# Patient Record
Sex: FEMALE | Race: BLACK OR AFRICAN AMERICAN | NOT HISPANIC OR LATINO
[De-identification: names, ages, dates, MRNs, and addresses within clinical notes are randomized per-mention and may not be internally consistent; named-entity substitution may affect disease eponyms.]

---

## 2019-08-13 ENCOUNTER — TRANSCRIPTION ENCOUNTER (OUTPATIENT)
Age: 34
End: 2019-08-13

## 2019-08-13 ENCOUNTER — APPOINTMENT (OUTPATIENT)
Dept: FAMILY MEDICINE | Facility: CLINIC | Age: 34
End: 2019-08-13
Payer: COMMERCIAL

## 2019-08-13 VITALS
DIASTOLIC BLOOD PRESSURE: 100 MMHG | HEART RATE: 87 BPM | WEIGHT: 187.19 LBS | SYSTOLIC BLOOD PRESSURE: 146 MMHG | HEIGHT: 62 IN | TEMPERATURE: 99.6 F | BODY MASS INDEX: 34.45 KG/M2 | OXYGEN SATURATION: 98 %

## 2019-08-13 DIAGNOSIS — Z82.3 FAMILY HISTORY OF STROKE: ICD-10-CM

## 2019-08-13 DIAGNOSIS — Z00.00 ENCOUNTER FOR GENERAL ADULT MEDICAL EXAMINATION W/OUT ABNORMAL FINDINGS: ICD-10-CM

## 2019-08-13 LAB
ALBUMIN SERPL ELPH-MCNC: 4.3 G/DL
ALP BLD-CCNC: 57 U/L
ALT SERPL-CCNC: 18 U/L
ANION GAP SERPL CALC-SCNC: 12 MMOL/L
APPEARANCE: CLEAR
AST SERPL-CCNC: 27 U/L
BASOPHILS # BLD AUTO: 0.02 K/UL
BASOPHILS NFR BLD AUTO: 0.5 %
BILIRUB SERPL-MCNC: 0.3 MG/DL
BILIRUBIN URINE: NEGATIVE
BLOOD URINE: NEGATIVE
BUN SERPL-MCNC: 6 MG/DL
CALCIUM SERPL-MCNC: 8.7 MG/DL
CHLORIDE SERPL-SCNC: 105 MMOL/L
CHOLEST SERPL-MCNC: 160 MG/DL
CHOLEST/HDLC SERPL: 2.9 RATIO
CO2 SERPL-SCNC: 22 MMOL/L
COLOR: NORMAL
CREAT SERPL-MCNC: 0.7 MG/DL
CYTOLOGY CVX/VAG DOC THIN PREP: NORMAL
EOSINOPHIL # BLD AUTO: 0.09 K/UL
EOSINOPHIL NFR BLD AUTO: 2.2 %
ESTIMATED AVERAGE GLUCOSE: 111 MG/DL
GLUCOSE QUALITATIVE U: NEGATIVE
GLUCOSE SERPL-MCNC: 100 MG/DL
HBA1C MFR BLD HPLC: 5.5 %
HCT VFR BLD CALC: 34.6 %
HDLC SERPL-MCNC: 55 MG/DL
HGB BLD-MCNC: 10.5 G/DL
IMM GRANULOCYTES NFR BLD AUTO: 0.2 %
KETONES URINE: NEGATIVE
LDLC SERPL CALC-MCNC: 85 MG/DL
LEUKOCYTE ESTERASE URINE: NEGATIVE
LYMPHOCYTES # BLD AUTO: 1.86 K/UL
LYMPHOCYTES NFR BLD AUTO: 45.3 %
MAN DIFF?: NORMAL
MCHC RBC-ENTMCNC: 24.7 PG
MCHC RBC-ENTMCNC: 30.3 GM/DL
MCV RBC AUTO: 81.4 FL
MONOCYTES # BLD AUTO: 0.38 K/UL
MONOCYTES NFR BLD AUTO: 9.2 %
NEUTROPHILS # BLD AUTO: 1.75 K/UL
NEUTROPHILS NFR BLD AUTO: 42.6 %
NITRITE URINE: NEGATIVE
PH URINE: 7.5
PLATELET # BLD AUTO: 205 K/UL
POTASSIUM SERPL-SCNC: 4.1 MMOL/L
PROT SERPL-MCNC: 6.9 G/DL
PROTEIN URINE: NEGATIVE
RBC # BLD: 4.25 M/UL
RBC # FLD: 15.7 %
SODIUM SERPL-SCNC: 139 MMOL/L
SPECIFIC GRAVITY URINE: 1.02
TRIGL SERPL-MCNC: 102 MG/DL
TSH SERPL-ACNC: 2.22 UIU/ML
UROBILINOGEN URINE: NORMAL
WBC # FLD AUTO: 4.11 K/UL

## 2019-08-13 PROCEDURE — 36415 COLL VENOUS BLD VENIPUNCTURE: CPT

## 2019-08-13 PROCEDURE — 99385 PREV VISIT NEW AGE 18-39: CPT | Mod: 25

## 2019-08-13 NOTE — ASSESSMENT
[FreeTextEntry1] : Patient was counseled on healthy eating habits, on daily exercise and stress relief. All medications and allergies were reviewed with the patient and any adjustments necessary were made. Patient was counseled to try engage in an exercise activity for at least 30 minutes 3-4 times a week.  Patient was advised to eat a diet low in fat and carbohydrates and high in protein, with plenty of vegetables. Patient was advised to try and engage in relaxing activities whenever possible.\par The patients blood was draw in office and will be followed and assessed for any issues.  Patient was told to return to the office if any issues arise.  Unless otherwise stated, the patient is to continue all other medications as previously prescribed.\par \par htn\par history of hypertension\par does not want treatment at this time\par advised of risks including cva, mi death

## 2019-08-13 NOTE — PHYSICAL EXAM
[Well Nourished] : well nourished [Regular Rhythm] : with a regular rhythm [Normal S1, S2] : normal S1 and S2 [No Joint Swelling] : no joint swelling [No Rash] : no rash [Normal Gait] : normal gait

## 2019-08-13 NOTE — HISTORY OF PRESENT ILLNESS
[de-identified] : 33 year old female here to establish care and for a full physical examination. The patients complete medical, family and social history was documented and reviewed with the patient.  The patients medications were identified and also reviewed with the patient as well as any allergies to any medications. All active and previous medical problems were identified and discussed with the patient.  Any new problems were documented. Patient is feeling well today.\par

## 2019-08-13 NOTE — HEALTH RISK ASSESSMENT
[Very Good] : ~his/her~  mood as very good [] : No [Yes] : Yes [No falls in past year] : Patient reported no falls in the past year [0] : 2) Feeling down, depressed, or hopeless: Not at all (0) [[id number]] : 0 [Patient reported PAP Smear was normal] : Patient reported PAP Smear was normal [HIV Test offered] : HIV Test offered [Hepatitis C test offered] : Hepatitis C test offered [With Family] : lives with family [Employed] : employed [Single] : single [Fully functional (bathing, dressing, toileting, transferring, walking, feeding)] : Fully functional (bathing, dressing, toileting, transferring, walking, feeding) [Fully functional (using the telephone, shopping, preparing meals, housekeeping, doing laundry, using] : Fully functional and needs no help or supervision to perform IADLs (using the telephone, shopping, preparing meals, housekeeping, doing laundry, using transportation, managing medications and managing finances) [Smoke Detector] : smoke detector [Seat Belt] :  uses seat belt [PapSmearDate] : 2015 [de-identified] : sister [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

## 2020-06-26 ENCOUNTER — OUTPATIENT (OUTPATIENT)
Dept: OUTPATIENT SERVICES | Facility: HOSPITAL | Age: 35
LOS: 1 days | End: 2020-06-26

## 2020-06-26 VITALS
HEART RATE: 90 BPM | TEMPERATURE: 99 F | DIASTOLIC BLOOD PRESSURE: 80 MMHG | HEIGHT: 62 IN | RESPIRATION RATE: 18 BRPM | WEIGHT: 203.93 LBS | SYSTOLIC BLOOD PRESSURE: 140 MMHG | OXYGEN SATURATION: 98 %

## 2020-06-26 DIAGNOSIS — S43.402A UNSPECIFIED SPRAIN OF LEFT SHOULDER JOINT, INITIAL ENCOUNTER: ICD-10-CM

## 2020-06-26 LAB
HCG UR-SCNC: NEGATIVE — SIGNIFICANT CHANGE UP
HCT VFR BLD CALC: 33.4 % — LOW (ref 34.5–45)
HGB BLD-MCNC: 10.5 G/DL — LOW (ref 11.5–15.5)
MCHC RBC-ENTMCNC: 25.2 PG — LOW (ref 27–34)
MCHC RBC-ENTMCNC: 31.4 % — LOW (ref 32–36)
MCV RBC AUTO: 80.1 FL — SIGNIFICANT CHANGE UP (ref 80–100)
NRBC # FLD: 0 K/UL — SIGNIFICANT CHANGE UP (ref 0–0)
PLATELET # BLD AUTO: 193 K/UL — SIGNIFICANT CHANGE UP (ref 150–400)
PMV BLD: 13.3 FL — HIGH (ref 7–13)
RBC # BLD: 4.17 M/UL — SIGNIFICANT CHANGE UP (ref 3.8–5.2)
RBC # FLD: 18 % — HIGH (ref 10.3–14.5)
SP GR UR: 1.01 — SIGNIFICANT CHANGE UP (ref 1–1.04)
WBC # BLD: 6.45 K/UL — SIGNIFICANT CHANGE UP (ref 3.8–10.5)
WBC # FLD AUTO: 6.45 K/UL — SIGNIFICANT CHANGE UP (ref 3.8–10.5)

## 2020-06-26 RX ORDER — SODIUM CHLORIDE 9 MG/ML
1000 INJECTION, SOLUTION INTRAVENOUS
Refills: 0 | Status: DISCONTINUED | OUTPATIENT
Start: 2020-07-06 | End: 2020-07-21

## 2020-06-26 NOTE — H&P PST ADULT - RS GEN PE MLT RESP DETAILS PC
breath sounds equal/no rales/clear to auscultation bilaterally/respirations non-labored/no wheezes/no rhonchi

## 2020-06-26 NOTE — H&P PST ADULT - HISTORY OF PRESENT ILLNESS
This is a 34 y.o. female with left shoulder pain , evaluated . Pt had x-raay of left shoulder , MRI of left shoulder . Pt has unspecified sprain of left shoulder joint , initial encounter . Pt now for surgery .

## 2020-06-26 NOTE — H&P PST ADULT - MUSCULOSKELETAL
no joint swelling/no joint erythema/no joint warmth/no calf tenderness/decreased ROM due to pain/left shoulder details… detailed exam

## 2020-06-26 NOTE — H&P PST ADULT - SUPRACLAVICULAR L
Since she has been taking that much benzodiazepines, she may end up going through detox. Benzodiazepines are contraindicated for elderly populations due to the side effects and the amount she is taking is not appropriate for her age, nor is it consistent with prescribed dosing. I will give her Klonopin 0.5 mg po QHS to help her limit detox. She will take 0.5 mg po QHS x 14 days and then she is to discontinue the medication. Therefore, I will dispense 13 tablets (since she still has a pill left) and no more because this medication will be discontinued by the end of that 14 days. She is to not take more than prescribed. Please let the patient know. Script sent to pharm. If she uses Xanax like she has been taking the Klonopin, Xanax will have to be discontinued as well and will be addressed at her next appointment with [facility]. Please reinforce Xanax is for emergency use only during the day. Juju will be notified of the treatment plan update.   normal

## 2020-06-26 NOTE — H&P PST ADULT - NSICDXPROBLEM_GEN_ALL_CORE_FT
PROBLEM DIAGNOSES  Problem: Unspecified sprain of left shoulder joint, initial encounter  Assessment and Plan: left shoulder arthroscopy ,rotator cuff repair   Chlorhexidine wash with verbal and written instructions , teach back appropriate.  Famotidine given as per [facility] recommendation

## 2020-06-26 NOTE — H&P PST ADULT - LAST CARDIAC ANGIOGRAM/IMAGING
denies
Spine appears normal, range of motion is not limited, no muscle or joint tenderness. neck is supple, FROM

## 2020-07-03 DIAGNOSIS — Z01.818 ENCOUNTER FOR OTHER PREPROCEDURAL EXAMINATION: ICD-10-CM

## 2020-07-03 PROBLEM — D50.9 IRON DEFICIENCY ANEMIA, UNSPECIFIED: Chronic | Status: ACTIVE | Noted: 2020-06-26

## 2020-07-04 ENCOUNTER — APPOINTMENT (OUTPATIENT)
Dept: DISASTER EMERGENCY | Facility: CLINIC | Age: 35
End: 2020-07-04

## 2020-07-05 ENCOUNTER — TRANSCRIPTION ENCOUNTER (OUTPATIENT)
Age: 35
End: 2020-07-05

## 2020-07-05 LAB — SARS-COV-2 N GENE NPH QL NAA+PROBE: NOT DETECTED

## 2020-07-06 ENCOUNTER — OUTPATIENT (OUTPATIENT)
Dept: OUTPATIENT SERVICES | Facility: HOSPITAL | Age: 35
LOS: 1 days | Discharge: HOME | End: 2020-07-06

## 2020-07-06 VITALS
SYSTOLIC BLOOD PRESSURE: 123 MMHG | DIASTOLIC BLOOD PRESSURE: 72 MMHG | HEART RATE: 96 BPM | TEMPERATURE: 98 F | RESPIRATION RATE: 21 BRPM | OXYGEN SATURATION: 97 %

## 2020-07-06 VITALS
TEMPERATURE: 98 F | HEART RATE: 91 BPM | HEIGHT: 62 IN | OXYGEN SATURATION: 98 % | SYSTOLIC BLOOD PRESSURE: 145 MMHG | RESPIRATION RATE: 18 BRPM | WEIGHT: 203.93 LBS | DIASTOLIC BLOOD PRESSURE: 81 MMHG

## 2020-07-06 DIAGNOSIS — S43.402A UNSPECIFIED SPRAIN OF LEFT SHOULDER JOINT, INITIAL ENCOUNTER: ICD-10-CM

## 2020-07-06 RX ORDER — ONDANSETRON 8 MG/1
1 TABLET, FILM COATED ORAL
Qty: 12 | Refills: 0
Start: 2020-07-06 | End: 2020-07-08

## 2020-07-06 RX ORDER — ASCORBIC ACID 60 MG
1 TABLET,CHEWABLE ORAL
Qty: 0 | Refills: 0 | DISCHARGE

## 2020-07-06 RX ORDER — FERROUS SULFATE 325(65) MG
1 TABLET ORAL
Qty: 0 | Refills: 0 | DISCHARGE

## 2020-07-06 NOTE — ASU DISCHARGE PLAN (ADULT/PEDIATRIC) - BATHING
Please see email regarding hydration.  
Pls see email instructions and orders  
Do not submerge in water/Shower only

## 2020-07-06 NOTE — ASU DISCHARGE PLAN (ADULT/PEDIATRIC) - PAIN MANAGEMENT
Prescriptions electronically submitted to pharmacy from [facility] Prescriptions electronically submitted to pharmacy from [facility]/No tylenol( or tylenol containing pain meds) until after 2:20pm; No motrin/ advil until afte 3pm

## 2020-07-06 NOTE — ASU DISCHARGE PLAN (ADULT/PEDIATRIC) - CARE PROVIDER_API CALL
Jamal Viramontes  ORTHOPAEDIC SURGERY  [address] [place] [address] [address] [place] [id number]  Phone: [phone]  Fax: [phone]  Follow Up Time:

## 2020-07-06 NOTE — ASU DISCHARGE PLAN (ADULT/PEDIATRIC) - SPECIFY DIET AND FLUID
Nothing spicy, greasy or fried food for the first 12 hours to prevent nausea and vomiting. start with clear liquids and gradually increase your diet as you can, until you return to your normal diet.

## 2020-07-06 NOTE — ASU DISCHARGE PLAN (ADULT/PEDIATRIC) - CALL YOUR DOCTOR IF YOU HAVE ANY OF THE FOLLOWING:
Swelling that gets worse/Pain not relieved by Medications/Bleeding that does not stop/Wound/Surgical Site with redness, or foul smelling discharge or pus/Numbness, tingling, color or temperature change to extremity/Fever greater than (need to indicate Fahrenheit or Celsius) Unable to urinate/Swelling that gets worse/Nausea and vomiting that does not stop/Numbness, tingling, color or temperature change to extremity/Wound/Surgical Site with redness, or foul smelling discharge or pus/Bleeding that does not stop/Fever greater than (need to indicate Fahrenheit or Celsius)/Pain not relieved by Medications/Inability to tolerate liquids or foods